# Patient Record
Sex: MALE | Race: WHITE | NOT HISPANIC OR LATINO | Employment: FULL TIME | URBAN - METROPOLITAN AREA
[De-identification: names, ages, dates, MRNs, and addresses within clinical notes are randomized per-mention and may not be internally consistent; named-entity substitution may affect disease eponyms.]

---

## 2023-03-20 DIAGNOSIS — I10 HYPERTENSION, UNSPECIFIED TYPE: Primary | ICD-10-CM

## 2023-03-20 RX ORDER — TELMISARTAN 20 MG/1
20 TABLET ORAL DAILY
Qty: 90 TABLET | Refills: 1 | Status: SHIPPED | OUTPATIENT
Start: 2023-03-20 | End: 2023-08-23 | Stop reason: SDUPTHER

## 2023-03-20 RX ORDER — TELMISARTAN 20 MG/1
20 TABLET ORAL DAILY
COMMUNITY
End: 2023-03-20 | Stop reason: SDUPTHER

## 2023-07-10 ENCOUNTER — APPOINTMENT (OUTPATIENT)
Dept: PRIMARY CARE | Facility: CLINIC | Age: 58
End: 2023-07-10
Payer: COMMERCIAL

## 2023-07-18 ENCOUNTER — APPOINTMENT (OUTPATIENT)
Dept: PRIMARY CARE | Facility: CLINIC | Age: 58
End: 2023-07-18
Payer: COMMERCIAL

## 2023-08-20 DIAGNOSIS — G43.809 OTHER MIGRAINE WITHOUT STATUS MIGRAINOSUS, NOT INTRACTABLE: Primary | ICD-10-CM

## 2023-08-22 PROBLEM — G43.909 MIGRAINE: Status: ACTIVE | Noted: 2023-08-22

## 2023-08-22 PROBLEM — E78.5 HYPERLIPIDEMIA: Status: ACTIVE | Noted: 2023-08-22

## 2023-08-22 PROBLEM — M79.2 NEUROPATHIC PAIN: Status: ACTIVE | Noted: 2021-09-01

## 2023-08-22 PROBLEM — B02.9 SHINGLES: Status: ACTIVE | Noted: 2023-08-22

## 2023-08-22 PROBLEM — D72.829 LEUKOCYTOSIS: Status: ACTIVE | Noted: 2023-08-22

## 2023-08-22 PROBLEM — R11.2 NAUSEA AND VOMITING: Status: ACTIVE | Noted: 2023-08-22

## 2023-08-22 PROBLEM — R73.9 ELEVATED BLOOD SUGAR: Status: ACTIVE | Noted: 2023-08-22

## 2023-08-22 PROBLEM — L42 PITYRIASIS ROSEA: Status: ACTIVE | Noted: 2023-08-22

## 2023-08-22 PROBLEM — N17.9 AKI (ACUTE KIDNEY INJURY) (CMS-HCC): Status: ACTIVE | Noted: 2023-08-22

## 2023-08-22 PROBLEM — Z20.822 EXPOSURE TO COVID-19 VIRUS: Status: ACTIVE | Noted: 2023-08-22

## 2023-08-22 PROBLEM — E78.2 MIXED HYPERLIPIDEMIA: Status: ACTIVE | Noted: 2023-08-22

## 2023-08-22 PROBLEM — K42.9 UMBILICAL HERNIA WITHOUT OBSTRUCTION AND WITHOUT GANGRENE: Status: ACTIVE | Noted: 2023-08-22

## 2023-08-22 PROBLEM — G89.4 CHRONIC PAIN SYNDROME: Status: ACTIVE | Noted: 2021-11-10

## 2023-08-22 PROBLEM — M19.90 DJD (DEGENERATIVE JOINT DISEASE): Status: ACTIVE | Noted: 2018-01-18

## 2023-08-22 PROBLEM — M79.18 MYOFASCIAL PAIN: Status: ACTIVE | Noted: 2021-09-01

## 2023-08-22 PROBLEM — M47.816 SPONDYLOSIS OF LUMBAR REGION WITHOUT MYELOPATHY OR RADICULOPATHY: Status: ACTIVE | Noted: 2021-09-01

## 2023-08-22 PROBLEM — N20.0 NEPHROLITHIASIS: Status: ACTIVE | Noted: 2023-08-22

## 2023-08-22 RX ORDER — CLOBETASOL PROPIONATE 0.5 MG/G
OINTMENT TOPICAL 2 TIMES DAILY
COMMUNITY
Start: 2023-06-01

## 2023-08-22 RX ORDER — SUMATRIPTAN SUCCINATE 25 MG/1
TABLET ORAL
COMMUNITY
Start: 2022-09-19 | End: 2023-08-23 | Stop reason: ALTCHOICE

## 2023-08-22 RX ORDER — DICLOFENAC SODIUM 10 MG/G
GEL TOPICAL
COMMUNITY
Start: 2023-03-22

## 2023-08-22 RX ORDER — DICLOFENAC SODIUM 75 MG/1
75 TABLET, DELAYED RELEASE ORAL 2 TIMES DAILY
COMMUNITY
Start: 2023-05-09 | End: 2023-11-05

## 2023-08-22 RX ORDER — SUMATRIPTAN SUCCINATE 25 MG/1
TABLET ORAL
Qty: 6 TABLET | Refills: 0 | Status: SHIPPED | OUTPATIENT
Start: 2023-08-22

## 2023-08-22 RX ORDER — ADALIMUMAB 40MG/0.4ML
1 KIT SUBCUTANEOUS
COMMUNITY
Start: 2023-05-09

## 2023-08-22 RX ORDER — ATORVASTATIN CALCIUM 40 MG/1
40 TABLET, FILM COATED ORAL NIGHTLY
COMMUNITY
Start: 2023-02-07 | End: 2023-08-23 | Stop reason: SDUPTHER

## 2023-08-22 RX ORDER — SIMVASTATIN 40 MG/1
TABLET, FILM COATED ORAL
COMMUNITY
End: 2024-05-28 | Stop reason: SINTOL

## 2023-08-22 RX ORDER — TRAMADOL HYDROCHLORIDE 50 MG/1
TABLET ORAL
COMMUNITY

## 2023-08-22 RX ORDER — PREDNISONE 5 MG/1
5 TABLET ORAL
COMMUNITY
Start: 2014-08-26 | End: 2023-11-05

## 2023-08-22 RX ORDER — LIDOCAINE 50 MG/G
2 PATCH TOPICAL
COMMUNITY
Start: 2018-05-02

## 2023-08-23 ENCOUNTER — OFFICE VISIT (OUTPATIENT)
Dept: PRIMARY CARE | Facility: CLINIC | Age: 58
End: 2023-08-23
Payer: COMMERCIAL

## 2023-08-23 ENCOUNTER — LAB (OUTPATIENT)
Dept: LAB | Facility: LAB | Age: 58
End: 2023-08-23
Payer: COMMERCIAL

## 2023-08-23 VITALS
DIASTOLIC BLOOD PRESSURE: 66 MMHG | OXYGEN SATURATION: 97 % | HEIGHT: 67 IN | SYSTOLIC BLOOD PRESSURE: 106 MMHG | RESPIRATION RATE: 18 BRPM | BODY MASS INDEX: 33.84 KG/M2 | HEART RATE: 85 BPM | WEIGHT: 215.6 LBS

## 2023-08-23 DIAGNOSIS — E78.2 MIXED HYPERLIPIDEMIA: ICD-10-CM

## 2023-08-23 DIAGNOSIS — Z79.52 CURRENT CHRONIC USE OF SYSTEMIC STEROIDS: ICD-10-CM

## 2023-08-23 DIAGNOSIS — I10 HYPERTENSION, UNSPECIFIED TYPE: ICD-10-CM

## 2023-08-23 DIAGNOSIS — M45.9 ANKYLOSING SPONDYLITIS, UNSPECIFIED SITE OF SPINE (MULTI): ICD-10-CM

## 2023-08-23 DIAGNOSIS — G43.809 OTHER MIGRAINE WITHOUT STATUS MIGRAINOSUS, NOT INTRACTABLE: ICD-10-CM

## 2023-08-23 DIAGNOSIS — I10 ESSENTIAL HYPERTENSION, BENIGN: Primary | ICD-10-CM

## 2023-08-23 DIAGNOSIS — I10 ESSENTIAL HYPERTENSION, BENIGN: ICD-10-CM

## 2023-08-23 PROBLEM — N17.9 AKI (ACUTE KIDNEY INJURY) (CMS-HCC): Status: RESOLVED | Noted: 2023-08-22 | Resolved: 2023-08-23

## 2023-08-23 PROBLEM — B02.9 SHINGLES: Status: RESOLVED | Noted: 2023-08-22 | Resolved: 2023-08-23

## 2023-08-23 PROBLEM — R11.2 NAUSEA AND VOMITING: Status: RESOLVED | Noted: 2023-08-22 | Resolved: 2023-08-23

## 2023-08-23 PROBLEM — Z20.822 EXPOSURE TO COVID-19 VIRUS: Status: RESOLVED | Noted: 2023-08-22 | Resolved: 2023-08-23

## 2023-08-23 PROBLEM — L42 PITYRIASIS ROSEA: Status: RESOLVED | Noted: 2023-08-22 | Resolved: 2023-08-23

## 2023-08-23 PROBLEM — K42.9 UMBILICAL HERNIA WITHOUT OBSTRUCTION AND WITHOUT GANGRENE: Status: RESOLVED | Noted: 2023-08-22 | Resolved: 2023-08-23

## 2023-08-23 LAB
ALANINE AMINOTRANSFERASE (SGPT) (U/L) IN SER/PLAS: 30 U/L (ref 10–52)
ALBUMIN (G/DL) IN SER/PLAS: 4.2 G/DL (ref 3.4–5)
ALKALINE PHOSPHATASE (U/L) IN SER/PLAS: 92 U/L (ref 33–120)
ANION GAP IN SER/PLAS: 9 MMOL/L (ref 10–20)
ASPARTATE AMINOTRANSFERASE (SGOT) (U/L) IN SER/PLAS: 17 U/L (ref 9–39)
BASOPHILS (10*3/UL) IN BLOOD BY AUTOMATED COUNT: 0.07 X10E9/L (ref 0–0.1)
BASOPHILS/100 LEUKOCYTES IN BLOOD BY AUTOMATED COUNT: 0.7 % (ref 0–2)
BILIRUBIN TOTAL (MG/DL) IN SER/PLAS: 0.4 MG/DL (ref 0–1.2)
CALCIUM (MG/DL) IN SER/PLAS: 8.7 MG/DL (ref 8.6–10.3)
CARBON DIOXIDE, TOTAL (MMOL/L) IN SER/PLAS: 26 MMOL/L (ref 21–32)
CHLORIDE (MMOL/L) IN SER/PLAS: 100 MMOL/L (ref 98–107)
CREATININE (MG/DL) IN SER/PLAS: 1.12 MG/DL (ref 0.5–1.3)
EOSINOPHILS (10*3/UL) IN BLOOD BY AUTOMATED COUNT: 0.13 X10E9/L (ref 0–0.7)
EOSINOPHILS/100 LEUKOCYTES IN BLOOD BY AUTOMATED COUNT: 1.4 % (ref 0–6)
ERYTHROCYTE DISTRIBUTION WIDTH (RATIO) BY AUTOMATED COUNT: 12.3 % (ref 11.5–14.5)
ERYTHROCYTE MEAN CORPUSCULAR HEMOGLOBIN CONCENTRATION (G/DL) BY AUTOMATED: 32.4 G/DL (ref 32–36)
ERYTHROCYTE MEAN CORPUSCULAR VOLUME (FL) BY AUTOMATED COUNT: 93 FL (ref 80–100)
ERYTHROCYTES (10*6/UL) IN BLOOD BY AUTOMATED COUNT: 4.72 X10E12/L (ref 4.5–5.9)
ESTIMATED AVERAGE GLUCOSE FOR HBA1C: 128 MG/DL
GFR MALE: 77 ML/MIN/1.73M2
GLUCOSE (MG/DL) IN SER/PLAS: 151 MG/DL (ref 74–99)
HEMATOCRIT (%) IN BLOOD BY AUTOMATED COUNT: 44.1 % (ref 41–52)
HEMOGLOBIN (G/DL) IN BLOOD: 14.3 G/DL (ref 13.5–17.5)
HEMOGLOBIN A1C/HEMOGLOBIN TOTAL IN BLOOD: 6.1 %
IMMATURE GRANULOCYTES/100 LEUKOCYTES IN BLOOD BY AUTOMATED COUNT: 0.5 % (ref 0–0.9)
LEUKOCYTES (10*3/UL) IN BLOOD BY AUTOMATED COUNT: 9.6 X10E9/L (ref 4.4–11.3)
LYMPHOCYTES (10*3/UL) IN BLOOD BY AUTOMATED COUNT: 1.7 X10E9/L (ref 1.2–4.8)
LYMPHOCYTES/100 LEUKOCYTES IN BLOOD BY AUTOMATED COUNT: 17.8 % (ref 13–44)
MONOCYTES (10*3/UL) IN BLOOD BY AUTOMATED COUNT: 0.95 X10E9/L (ref 0.1–1)
MONOCYTES/100 LEUKOCYTES IN BLOOD BY AUTOMATED COUNT: 9.9 % (ref 2–10)
NEUTROPHILS (10*3/UL) IN BLOOD BY AUTOMATED COUNT: 6.67 X10E9/L (ref 1.2–7.7)
NEUTROPHILS/100 LEUKOCYTES IN BLOOD BY AUTOMATED COUNT: 69.7 % (ref 40–80)
PLATELETS (10*3/UL) IN BLOOD AUTOMATED COUNT: 269 X10E9/L (ref 150–450)
POTASSIUM (MMOL/L) IN SER/PLAS: 4.2 MMOL/L (ref 3.5–5.3)
PROTEIN TOTAL: 7 G/DL (ref 6.4–8.2)
SODIUM (MMOL/L) IN SER/PLAS: 131 MMOL/L (ref 136–145)
UREA NITROGEN (MG/DL) IN SER/PLAS: 16 MG/DL (ref 6–23)

## 2023-08-23 PROCEDURE — 3074F SYST BP LT 130 MM HG: CPT | Performed by: STUDENT IN AN ORGANIZED HEALTH CARE EDUCATION/TRAINING PROGRAM

## 2023-08-23 PROCEDURE — 83036 HEMOGLOBIN GLYCOSYLATED A1C: CPT

## 2023-08-23 PROCEDURE — 1036F TOBACCO NON-USER: CPT | Performed by: STUDENT IN AN ORGANIZED HEALTH CARE EDUCATION/TRAINING PROGRAM

## 2023-08-23 PROCEDURE — 80053 COMPREHEN METABOLIC PANEL: CPT

## 2023-08-23 PROCEDURE — 36415 COLL VENOUS BLD VENIPUNCTURE: CPT

## 2023-08-23 PROCEDURE — 99214 OFFICE O/P EST MOD 30 MIN: CPT | Performed by: STUDENT IN AN ORGANIZED HEALTH CARE EDUCATION/TRAINING PROGRAM

## 2023-08-23 PROCEDURE — 3078F DIAST BP <80 MM HG: CPT | Performed by: STUDENT IN AN ORGANIZED HEALTH CARE EDUCATION/TRAINING PROGRAM

## 2023-08-23 PROCEDURE — 85025 COMPLETE CBC W/AUTO DIFF WBC: CPT

## 2023-08-23 RX ORDER — TELMISARTAN 20 MG/1
20 TABLET ORAL DAILY
Qty: 90 TABLET | Refills: 1 | Status: SHIPPED | OUTPATIENT
Start: 2023-08-23 | End: 2024-02-26 | Stop reason: SDUPTHER

## 2023-08-23 RX ORDER — ATORVASTATIN CALCIUM 40 MG/1
40 TABLET, FILM COATED ORAL NIGHTLY
Qty: 90 TABLET | Refills: 1 | Status: SHIPPED | OUTPATIENT
Start: 2023-08-23 | End: 2024-02-26 | Stop reason: SDUPTHER

## 2023-08-23 RX ORDER — SUMATRIPTAN SUCCINATE 25 MG/1
TABLET ORAL
Qty: 9 TABLET | Refills: 3 | Status: SHIPPED | OUTPATIENT
Start: 2023-08-23

## 2023-08-23 ASSESSMENT — PATIENT HEALTH QUESTIONNAIRE - PHQ9
SUM OF ALL RESPONSES TO PHQ9 QUESTIONS 1 AND 2: 0
1. LITTLE INTEREST OR PLEASURE IN DOING THINGS: NOT AT ALL
2. FEELING DOWN, DEPRESSED OR HOPELESS: NOT AT ALL

## 2023-08-23 NOTE — ASSESSMENT & PLAN NOTE
Continue telmisartan 20 mg daily  Physical exam was stable. Discussed maintaining diets such as DASH to help maintain normal Blood pressure. Encouraged regular exercise for a total of 120 min weekly. We will fu labs in 1-3 days. For now there will be no change in medical management. All questions and concerns were addressed. Pt will follow up in 4-6 months or sooner if needed.

## 2023-08-23 NOTE — PROGRESS NOTES
Subjective   Patient ID: Javier Tomlin Sr. is a 58 y.o. male who presents for Follow-up (Pt is here for a follow up and med refills.).  PMH sig for kidney stones, HTN, anemia, left epicondylitis, plantar fasciitis, Achilles tendinitis    Ankylosing spondylitis  Following with rheumatology no changes have been made to his regimen since last time I saw him    Migraines   Doing well with sumatriptan   Using only two monthly currently         Review of Systems    Objective   Physical Exam  Vitals reviewed.   Constitutional:       Appearance: Normal appearance.   Cardiovascular:      Rate and Rhythm: Normal rate and regular rhythm.      Heart sounds: No murmur heard.  Pulmonary:      Effort: Pulmonary effort is normal. No respiratory distress.      Breath sounds: Normal breath sounds. No wheezing.   Musculoskeletal:      Cervical back: Neck supple.      Left lower leg: No edema.   Neurological:      Mental Status: He is alert.         Assessment/Plan   Problem List Items Addressed This Visit       Ankylosing spondylitis (CMS/HCC)     Currently stable   Following with rheumatology   Humira and chronic prednisone 5mg daily   Tramadol for pain management     Dx: staph infection his senior year of high school (1985).  Disease course:   s/p right DUGLAS by age 32  s/p left hip resurfacing 2018  XR lumbar Jan 2017: DEGENERATIVE DISC AND FACET DISEASE  MRI C-spine Nov 2013:   fusion of the C1 lateral masses and occipital condyles.  C1/2 articulations appear to be normally positioned without evidence of subluxation.  Subtle T1 and T2 hypointense etching is noted of all the vertebral bodies and discs in keeping with the patient's diagnosis of ankylosing spondylitis with syndesmophytes formation.   partial fusion of some of the disc spaces most conspicuously at the T2/3 level anteriorly.  suggestion of fusion of facet joints especially the C3/4 facet joints.         Essential hypertension, benign - Primary     Continue telmisartan  20 mg daily  Physical exam was stable. Discussed maintaining diets such as DASH to help maintain normal Blood pressure. Encouraged regular exercise for a total of 120 min weekly. We will fu labs in 1-3 days. For now there will be no change in medical management. All questions and concerns were addressed. Pt will follow up in 4-6 months or sooner if needed.            Relevant Medications    atorvastatin (Lipitor) 40 mg tablet    Other Relevant Orders    CBC and Auto Differential    Comprehensive metabolic panel    Migraine    Relevant Medications    SUMAtriptan (Imitrex) 25 mg tablet    Mixed hyperlipidemia     Continue simvastatin 40 mg daily          Other Visit Diagnoses       Hypertension, unspecified type        Relevant Medications    telmisartan (MIcarDIS) 20 mg tablet    Current chronic use of systemic steroids        Relevant Orders    Hemoglobin A1C

## 2023-08-23 NOTE — ASSESSMENT & PLAN NOTE
Currently stable   Following with rheumatology   Humira and chronic prednisone 5mg daily   Tramadol for pain management     Dx: staph infection his senior year of high school (1985).  Disease course:   s/p right DUGLAS by age 32  s/p left hip resurfacing 2018  XR lumbar Jan 2017: DEGENERATIVE DISC AND FACET DISEASE  MRI C-spine Nov 2013:   fusion of the C1 lateral masses and occipital condyles.  C1/2 articulations appear to be normally positioned without evidence of subluxation.  Subtle T1 and T2 hypointense etching is noted of all the vertebral bodies and discs in keeping with the patient's diagnosis of ankylosing spondylitis with syndesmophytes formation.   partial fusion of some of the disc spaces most conspicuously at the T2/3 level anteriorly.  suggestion of fusion of facet joints especially the C3/4 facet joints.

## 2024-02-26 ENCOUNTER — APPOINTMENT (OUTPATIENT)
Dept: PRIMARY CARE | Facility: CLINIC | Age: 59
End: 2024-02-26
Payer: COMMERCIAL

## 2024-02-26 DIAGNOSIS — I10 HYPERTENSION, UNSPECIFIED TYPE: ICD-10-CM

## 2024-02-26 DIAGNOSIS — I10 ESSENTIAL HYPERTENSION, BENIGN: ICD-10-CM

## 2024-02-26 RX ORDER — TELMISARTAN 20 MG/1
20 TABLET ORAL DAILY
Qty: 90 TABLET | Refills: 1 | Status: SHIPPED | OUTPATIENT
Start: 2024-02-26

## 2024-02-26 RX ORDER — ATORVASTATIN CALCIUM 40 MG/1
40 TABLET, FILM COATED ORAL NIGHTLY
Qty: 90 TABLET | Refills: 1 | Status: SHIPPED | OUTPATIENT
Start: 2024-02-26

## 2024-05-20 ENCOUNTER — OFFICE VISIT (OUTPATIENT)
Dept: PRIMARY CARE | Facility: CLINIC | Age: 59
End: 2024-05-20
Payer: COMMERCIAL

## 2024-05-20 VITALS
RESPIRATION RATE: 16 BRPM | OXYGEN SATURATION: 95 % | BODY MASS INDEX: 31.77 KG/M2 | SYSTOLIC BLOOD PRESSURE: 102 MMHG | WEIGHT: 209.6 LBS | HEART RATE: 70 BPM | DIASTOLIC BLOOD PRESSURE: 56 MMHG | HEIGHT: 68 IN

## 2024-05-20 DIAGNOSIS — R05.2 SUBACUTE COUGH: ICD-10-CM

## 2024-05-20 DIAGNOSIS — E11.9 TYPE 2 DIABETES MELLITUS WITHOUT COMPLICATION, WITHOUT LONG-TERM CURRENT USE OF INSULIN (MULTI): ICD-10-CM

## 2024-05-20 DIAGNOSIS — I10 ESSENTIAL HYPERTENSION, BENIGN: ICD-10-CM

## 2024-05-20 DIAGNOSIS — M45.0 ANKYLOSING SPONDYLITIS OF MULTIPLE SITES IN SPINE (MULTI): ICD-10-CM

## 2024-05-20 DIAGNOSIS — Z00.00 ANNUAL PHYSICAL EXAM: Primary | ICD-10-CM

## 2024-05-20 LAB — POC HEMOGLOBIN A1C: 6.7 % (ref 4.2–6.5)

## 2024-05-20 PROCEDURE — 99214 OFFICE O/P EST MOD 30 MIN: CPT | Performed by: STUDENT IN AN ORGANIZED HEALTH CARE EDUCATION/TRAINING PROGRAM

## 2024-05-20 PROCEDURE — 99396 PREV VISIT EST AGE 40-64: CPT | Performed by: STUDENT IN AN ORGANIZED HEALTH CARE EDUCATION/TRAINING PROGRAM

## 2024-05-20 PROCEDURE — 83036 HEMOGLOBIN GLYCOSYLATED A1C: CPT | Performed by: STUDENT IN AN ORGANIZED HEALTH CARE EDUCATION/TRAINING PROGRAM

## 2024-05-20 PROCEDURE — 4010F ACE/ARB THERAPY RXD/TAKEN: CPT | Performed by: STUDENT IN AN ORGANIZED HEALTH CARE EDUCATION/TRAINING PROGRAM

## 2024-05-20 PROCEDURE — 3074F SYST BP LT 130 MM HG: CPT | Performed by: STUDENT IN AN ORGANIZED HEALTH CARE EDUCATION/TRAINING PROGRAM

## 2024-05-20 PROCEDURE — 3078F DIAST BP <80 MM HG: CPT | Performed by: STUDENT IN AN ORGANIZED HEALTH CARE EDUCATION/TRAINING PROGRAM

## 2024-05-20 RX ORDER — PREDNISONE 5 MG/1
5 TABLET ORAL DAILY
COMMUNITY

## 2024-05-20 RX ORDER — LANCETS
EACH MISCELLANEOUS
Qty: 100 EACH | Refills: 3 | Status: SHIPPED | OUTPATIENT
Start: 2024-05-20

## 2024-05-20 RX ORDER — BENZONATATE 200 MG/1
200 CAPSULE ORAL 3 TIMES DAILY PRN
Qty: 42 CAPSULE | Refills: 0 | Status: SHIPPED | OUTPATIENT
Start: 2024-05-20 | End: 2024-06-19

## 2024-05-20 RX ORDER — IBUPROFEN 200 MG
1 CAPSULE ORAL DAILY
Qty: 100 EACH | Refills: 3 | Status: SHIPPED | OUTPATIENT
Start: 2024-05-20

## 2024-05-20 ASSESSMENT — PATIENT HEALTH QUESTIONNAIRE - PHQ9
SUM OF ALL RESPONSES TO PHQ9 QUESTIONS 1 AND 2: 0
2. FEELING DOWN, DEPRESSED OR HOPELESS: NOT AT ALL
1. LITTLE INTEREST OR PLEASURE IN DOING THINGS: NOT AT ALL

## 2024-05-20 NOTE — PROGRESS NOTES
History Of Present Illness  Javier Tomlin Sr. is a 59 y.o. male presents for cpe.     Getting over sinus infection   Congestion, decreased hearing, dry cough    Has been getting upset stomach  Increased     Ankylosing spondylitis  Had a renal spike   Cut back on diclofenac   Currently on tramadol 50mg 2 tabs q6h and prednisone         Past Medical History  He has a past medical history of Arthritis, Headache, and Hypertension.    Surgical History  He has a past surgical history that includes Hip surgery (08/26/2014); Total hip arthroplasty (10/23/2018); Umbilical hernia repair (10/23/2018); Joint replacement; and MR angio head wo IV contrast (2/11/2016).     Social History  He reports that he has never smoked. He has never used smokeless tobacco. He reports that he does not currently use alcohol. He reports that he does not use drugs.    Family History  No family history on file.     Allergies  Patient has no known allergies.       Physical Exam  Vitals reviewed.   Constitutional:       General: He is not in acute distress.     Appearance: He is not ill-appearing.   HENT:      Right Ear: Tympanic membrane and ear canal normal.      Left Ear: Tympanic membrane and ear canal normal.      Mouth/Throat:      Mouth: Mucous membranes are moist.      Pharynx: Oropharynx is clear. No oropharyngeal exudate or posterior oropharyngeal erythema.   Eyes:      Extraocular Movements: Extraocular movements intact.      Conjunctiva/sclera: Conjunctivae normal.      Pupils: Pupils are equal, round, and reactive to light.   Neck:      Vascular: No carotid bruit.   Cardiovascular:      Rate and Rhythm: Normal rate and regular rhythm.      Heart sounds: No murmur heard.     No gallop.   Pulmonary:      Effort: Pulmonary effort is normal.      Breath sounds: Normal breath sounds. No wheezing, rhonchi or rales.   Abdominal:      General: Abdomen is flat. Bowel sounds are normal.      Palpations: Abdomen is soft.      Tenderness: There  is no abdominal tenderness.   Musculoskeletal:      Cervical back: Neck supple.      Left lower leg: No edema.   Skin:     General: Skin is warm and dry.      Findings: No rash.   Neurological:      General: No focal deficit present.      Mental Status: He is alert and oriented to person, place, and time.      Gait: Gait normal.   Psychiatric:         Mood and Affect: Mood normal.         Behavior: Behavior normal.        Last Recorded Vitals  /56   Pulse 70   Resp 16   Wt 95.1 kg (209 lb 9.6 oz)   SpO2 95%     Relevant Results      Current Outpatient Medications:     atorvastatin (Lipitor) 40 mg tablet, Take 1 tablet (40 mg) by mouth once daily at bedtime., Disp: 90 tablet, Rfl: 1    clobetasol (Temovate) 0.05 % ointment, Apply topically twice a day., Disp: , Rfl:     diclofenac sodium (Voltaren) 1 % gel gel, apply two inches to both heels twice a day., Disp: , Rfl:     lidocaine (Lidoderm) 5 % patch, Place 2 patches on the skin once daily., Disp: , Rfl:     predniSONE (Deltasone) 5 mg tablet, Take 1 tablet (5 mg) by mouth once daily., Disp: , Rfl:     SUMAtriptan (Imitrex) 25 mg tablet, TAKE 1 TABLET BY MOUTH AT THE ONSET OF HEADACHE. MAY TAKE 2ND DOSE IN 2 HOURS IF NEEDED. DO NOT EXCEED 200 MG (8 TABLETS) IN 24 HOURS, Disp: 9 tablet, Rfl: 3    telmisartan (MIcarDIS) 20 mg tablet, Take 1 tablet (20 mg) by mouth once daily., Disp: 90 tablet, Rfl: 1    traMADol (Ultram) 50 mg tablet, TAKE TWO TABLETS BY MOUTH EVERY SIX HOURS AS NEEDED FOR PAIN, Disp: , Rfl:     Humira,CF, Pen 40 mg/0.4 mL pen injector kit pen-injector, Inject 1 Pen (40 mg) under the skin every 14 (fourteen) days., Disp: , Rfl:     simvastatin (Zocor) 40 mg tablet, TAKE 1 AND 1/2 TABLETS BY MOUTH DAILY EVERY EVENING WITH A MEAL., Disp: , Rfl:     SUMAtriptan (Imitrex) 25 mg tablet, TAKE 1 TABLET BY MOUTH AT THE ONSET OF HEADACHE. MAY TAKE 2ND DOSE IN 2 HOURS IF NEEDED. DO NOT EXCEED 200 MG (8 TABLETS) IN 24 HOURS (Patient not taking:  Reported on 5/20/2024), Disp: 6 tablet, Rfl: 0        Assessment/Plan   Problem List Items Addressed This Visit             ICD-10-CM    Ankylosing spondylitis (Multi) M45.9    Relevant Medications    predniSONE (Deltasone) 5 mg tablet    Essential hypertension, benign - Primary I10     Other Visit Diagnoses         Codes    Subacute cough     R05.2    Relevant Medications    benzonatate (Tessalon) 200 mg capsule    Type 2 diabetes mellitus without complication, without long-term current use of insulin (Multi)     E11.9    Relevant Medications    lancets misc    blood sugar diagnostic (Blood Glucose Test) strip    Other Relevant Orders    POCT glycosylated hemoglobin (Hb A1C) manually resulted (Completed)    Home blood glucose meter            Ankylosing Spondylosis     Currently stable   Following with rheumatology   Humira and chronic prednisone 5mg daily   Tramadol for pain management      Dx: staph infection his senior year of high school (1985).  Disease course:   s/p right DUGLAS by age 32  s/p left hip resurfacing 2018  XR lumbar Jan 2017: DEGENERATIVE DISC AND FACET DISEASE  MRI C-spine Nov 2013:   fusion of the C1 lateral masses and occipital condyles.  C1/2 articulations appear to be normally positioned without evidence of subluxation.  Subtle T1 and T2 hypointense etching is noted of all the vertebral bodies and discs in keeping with the patient's diagnosis of ankylosing spondylitis with syndesmophytes formation.   partial fusion of some of the disc spaces most conspicuously at the T2/3 level anteriorly.  suggestion of fusion of facet joints especially the C3/4 facet joints.    HTN  Continue telmisartan 20 mg daily  Physical exam was stable. Discussed maintaining diets such as DASH to help maintain normal Blood pressure. Encouraged regular exercise for a total of 120 min weekly. We will fu labs in 1-3 days. For now there will be no change in medical management. All questions and concerns were addressed. Pt  will follow up in 4-6 months or sooner if needed.     Migraines   Intermittent   Stable with abortive therapy   Sumatriptan 25mg daily     HLD   Mixed   Pt stopped simvastatin due to muscle aches      Health Maintenance   Topic Date Due    Yearly Adult Physical  Never done    HIV Screening  Never done    Diabetes: Foot Exam  Never done    Diabetes: Retinopathy Screening  Never done    Hepatitis C Screening  Never done    Hepatitis B Vaccines (1 of 3 - 19+ 3-dose series) Never done    Diabetes: Urine Protein Screening  Never done    Zoster Vaccines (1 of 2) Never done    COVID-19 Vaccine (2 - Pfizer risk series) 03/18/2021    Lipid Panel  01/09/2024    Diabetes: Hemoglobin A1C  08/20/2024    Influenza Vaccine (Season Ended) 09/01/2024    Colorectal Cancer Screening  12/14/2030    Pneumococcal Vaccine: Pediatrics (0 to 5 Years) and At-Risk Patients (6 to 64 Years) (3 of 3 - PPSV23 or PCV20) 02/10/2032    DTaP/Tdap/Td Vaccines (2 - Td or Tdap) 09/12/2032    MMR Vaccines  Completed    HIB Vaccines  Aged Out    IPV Vaccines  Aged Out    Hepatitis A Vaccines  Aged Out    Meningococcal Vaccine  Aged Out    Rotavirus Vaccines  Aged Out    HPV Vaccines  Aged Out    Irritable Bowel Syndrome  Discontinued            Katlyn Mckenzie,

## 2024-05-21 ENCOUNTER — APPOINTMENT (OUTPATIENT)
Dept: PRIMARY CARE | Facility: CLINIC | Age: 59
End: 2024-05-21
Payer: COMMERCIAL

## 2024-08-26 ENCOUNTER — APPOINTMENT (OUTPATIENT)
Dept: PRIMARY CARE | Facility: CLINIC | Age: 59
End: 2024-08-26
Payer: COMMERCIAL

## 2024-08-26 VITALS
DIASTOLIC BLOOD PRESSURE: 64 MMHG | BODY MASS INDEX: 33.9 KG/M2 | WEIGHT: 216 LBS | HEIGHT: 67 IN | OXYGEN SATURATION: 96 % | SYSTOLIC BLOOD PRESSURE: 100 MMHG | HEART RATE: 70 BPM

## 2024-08-26 DIAGNOSIS — I10 ESSENTIAL HYPERTENSION, BENIGN: ICD-10-CM

## 2024-08-26 DIAGNOSIS — E11.9 TYPE 2 DIABETES MELLITUS WITHOUT COMPLICATION, WITHOUT LONG-TERM CURRENT USE OF INSULIN (MULTI): ICD-10-CM

## 2024-08-26 DIAGNOSIS — G43.809 OTHER MIGRAINE WITHOUT STATUS MIGRAINOSUS, NOT INTRACTABLE: ICD-10-CM

## 2024-08-26 DIAGNOSIS — I10 HYPERTENSION, UNSPECIFIED TYPE: ICD-10-CM

## 2024-08-26 PROCEDURE — 99214 OFFICE O/P EST MOD 30 MIN: CPT | Performed by: STUDENT IN AN ORGANIZED HEALTH CARE EDUCATION/TRAINING PROGRAM

## 2024-08-26 PROCEDURE — 3008F BODY MASS INDEX DOCD: CPT | Performed by: STUDENT IN AN ORGANIZED HEALTH CARE EDUCATION/TRAINING PROGRAM

## 2024-08-26 PROCEDURE — 1036F TOBACCO NON-USER: CPT | Performed by: STUDENT IN AN ORGANIZED HEALTH CARE EDUCATION/TRAINING PROGRAM

## 2024-08-26 PROCEDURE — 3078F DIAST BP <80 MM HG: CPT | Performed by: STUDENT IN AN ORGANIZED HEALTH CARE EDUCATION/TRAINING PROGRAM

## 2024-08-26 PROCEDURE — 3074F SYST BP LT 130 MM HG: CPT | Performed by: STUDENT IN AN ORGANIZED HEALTH CARE EDUCATION/TRAINING PROGRAM

## 2024-08-26 PROCEDURE — 4010F ACE/ARB THERAPY RXD/TAKEN: CPT | Performed by: STUDENT IN AN ORGANIZED HEALTH CARE EDUCATION/TRAINING PROGRAM

## 2024-08-26 RX ORDER — TELMISARTAN 20 MG/1
20 TABLET ORAL DAILY
Qty: 90 TABLET | Refills: 1 | Status: SHIPPED | OUTPATIENT
Start: 2024-08-26

## 2024-08-26 RX ORDER — ATORVASTATIN CALCIUM 40 MG/1
40 TABLET, FILM COATED ORAL NIGHTLY
Qty: 90 TABLET | Refills: 1 | Status: SHIPPED | OUTPATIENT
Start: 2024-08-26

## 2024-08-26 RX ORDER — SUMATRIPTAN SUCCINATE 25 MG/1
TABLET ORAL
Qty: 9 TABLET | Refills: 3 | Status: SHIPPED | OUTPATIENT
Start: 2024-08-26

## 2024-08-26 RX ORDER — INSULIN PUMP SYRINGE, 3 ML
1 EACH MISCELLANEOUS AS NEEDED
Qty: 1 EACH | Refills: 0 | Status: SHIPPED | OUTPATIENT
Start: 2024-08-26 | End: 2025-08-26

## 2024-08-26 ASSESSMENT — PATIENT HEALTH QUESTIONNAIRE - PHQ9
2. FEELING DOWN, DEPRESSED OR HOPELESS: NOT AT ALL
1. LITTLE INTEREST OR PLEASURE IN DOING THINGS: NOT AT ALL
SUM OF ALL RESPONSES TO PHQ9 QUESTIONS 1 AND 2: 0

## 2024-08-26 NOTE — PROGRESS NOTES
Subjective   Patient ID: Javier Tomlin Sr. is a 59 y.o. male who presents for Follow-up (Pt is here for a 3 month dm follow up.).    HPI   Pt presents to the office for evaluation of sugars. Last visit he was diagnosed with DM with an A1C of 6.7%.   Prednisone of 5mg daily due to Ankylosing Spondylitis   Pt recently switched to     Pt admits to night sweats recently around 2am in the morning.       Objective   Physical Exam  Vitals reviewed.   Constitutional:       Appearance: Normal appearance.   Cardiovascular:      Rate and Rhythm: Normal rate and regular rhythm.      Heart sounds: No murmur heard.  Pulmonary:      Effort: Pulmonary effort is normal. No respiratory distress.      Breath sounds: Normal breath sounds. No wheezing.   Musculoskeletal:      Cervical back: Neck supple.      Left lower leg: No edema.   Neurological:      Mental Status: He is alert.         Assessment/Plan   Diagnoses and all orders for this visit:  Type 2 diabetes mellitus without complication, without long-term current use of insulin (Multi)  -     Diabetic Supplies Glucometer  -     Blood glucose monitoring meter kit kit; 1 each if needed (use once daily to check blood sugar).  Hypertension, unspecified type  -     telmisartan (MIcarDIS) 20 mg tablet; Take 1 tablet (20 mg) by mouth once daily.  Essential hypertension, benign  -     atorvastatin (Lipitor) 40 mg tablet; Take 1 tablet (40 mg) by mouth once daily at bedtime.  Other migraine without status migrainosus, not intractable  -     SUMAtriptan (Imitrex) 25 mg tablet; TAKE 1 TABLET BY MOUTH AT THE ONSET OF HEADACHE. MAY TAKE 2ND DOSE IN 2 HOURS IF NEEDED. DO NOT EXCEED 200 MG (8 TABLETS) IN 24 HOURS            DM  A1C 6.1% 8/21/24  Advised pt to start checking his blood sugar when he is waking up in the middle of the night     Dx: staph infection his senior year of high school (1985).  Disease course:   s/p right DUGLAS by age 32  s/p left hip resurfacing 2018  XR lumbar Jan 2017:  DEGENERATIVE DISC AND FACET DISEASE  MRI C-spine Nov 2013:   fusion of the C1 lateral masses and occipital condyles.  C1/2 articulations appear to be normally positioned without evidence of subluxation.  Subtle T1 and T2 hypointense etching is noted of all the vertebral bodies and discs in keeping with the patient's diagnosis of ankylosing spondylitis with syndesmophytes formation.   partial fusion of some of the disc spaces most conspicuously at the T2/3 level anteriorly.  suggestion of fusion of facet joints especially the C3/4 facet joints.     HTN  Continue telmisartan 20 mg daily  Physical exam was stable. Discussed maintaining diets such as DASH to help maintain normal Blood pressure. Encouraged regular exercise for a total of 120 min weekly. We will fu labs in 1-3 days. For now there will be no change in medical management. All questions and concerns were addressed. Pt will follow up in 4-6 months or sooner if needed.     Currently stable   Following with rheumatology   Previously stable on Humira and chronic prednisone 5mg daily   Tramadol for pain management         Migraines   Intermittent stable   Stable with abortive therapy   Sumatriptan 25mg daily PRN     HLD   Mixed   Pt stopped simvastatin due to muscle aches          Health Maintenance  Topic Date Due   Yearly Adult Physical  Never done   HIV Screening  Never done   Diabetes: Foot Exam  Never done   Diabetes: Retinopathy Screening  Never done   Hepatitis C Screening  Never done   Hepatitis B Vaccines (1 of 3 - 19+ 3-dose series) Never done   Diabetes: Urine Protein Screening  Never done   Zoster Vaccines (1 of 2) Never done   COVID-19 Vaccine (2 - Pfizer risk series) 03/18/2021   Lipid Panel  01/09/2024   Diabetes: Hemoglobin A1C  08/20/2024   Influenza Vaccine (Season Ended) 09/01/2024   Colorectal Cancer Screening  12/14/2030   Pneumococcal Vaccine: Pediatrics (0 to 5 Years) and At-Risk Patients (6 to 64 Years) (3 of 3 - PPSV23 or  PCV20) 02/10/2032   DTaP/Tdap/Td Vaccines (2 - Td or Tdap) 09/12/2032   MMR Vaccines  Completed   HIB Vaccines  Aged Out   IPV Vaccines  Aged Out   Hepatitis A Vaccines  Aged Out   Meningococcal Vaccine  Aged Out   Rotavirus Vaccines  Aged Out   HPV Vaccines  Aged Out   Irritable Bowel Syndrome  Discontinued                 Katlyn Mckenzie DO       Electronically signed by Katlyn Mckenzie DO at 5/28/2024  5:59 PM         Katlyn Mckenzie DO 08/26/24 5:01 PM

## 2025-02-23 DIAGNOSIS — I10 HYPERTENSION, UNSPECIFIED TYPE: ICD-10-CM

## 2025-02-23 DIAGNOSIS — I10 ESSENTIAL HYPERTENSION, BENIGN: ICD-10-CM

## 2025-02-24 RX ORDER — ATORVASTATIN CALCIUM 40 MG/1
40 TABLET, FILM COATED ORAL NIGHTLY
Qty: 90 TABLET | Refills: 1 | Status: SHIPPED | OUTPATIENT
Start: 2025-02-24

## 2025-02-24 RX ORDER — TELMISARTAN 20 MG/1
20 TABLET ORAL DAILY
Qty: 90 TABLET | Refills: 1 | Status: SHIPPED | OUTPATIENT
Start: 2025-02-24

## 2025-02-27 ENCOUNTER — OFFICE VISIT (OUTPATIENT)
Dept: PRIMARY CARE | Facility: CLINIC | Age: 60
End: 2025-02-27
Payer: COMMERCIAL

## 2025-02-27 ENCOUNTER — APPOINTMENT (OUTPATIENT)
Dept: PRIMARY CARE | Facility: CLINIC | Age: 60
End: 2025-02-27
Payer: COMMERCIAL

## 2025-02-27 VITALS
OXYGEN SATURATION: 90 % | HEIGHT: 67 IN | HEART RATE: 100 BPM | DIASTOLIC BLOOD PRESSURE: 75 MMHG | BODY MASS INDEX: 33.83 KG/M2 | SYSTOLIC BLOOD PRESSURE: 121 MMHG

## 2025-02-27 DIAGNOSIS — B34.9 VIRAL SYNDROME: ICD-10-CM

## 2025-02-27 DIAGNOSIS — J10.1 INFLUENZA A: Primary | ICD-10-CM

## 2025-02-27 LAB
POC RAPID INFLUENZA A: POSITIVE
POC RAPID INFLUENZA B: NEGATIVE

## 2025-02-27 PROCEDURE — 99213 OFFICE O/P EST LOW 20 MIN: CPT

## 2025-02-27 PROCEDURE — 3074F SYST BP LT 130 MM HG: CPT

## 2025-02-27 PROCEDURE — 1036F TOBACCO NON-USER: CPT

## 2025-02-27 PROCEDURE — 3078F DIAST BP <80 MM HG: CPT

## 2025-02-27 PROCEDURE — 87804 INFLUENZA ASSAY W/OPTIC: CPT

## 2025-02-27 RX ORDER — OSELTAMIVIR PHOSPHATE 75 MG/1
75 CAPSULE ORAL 2 TIMES DAILY
Qty: 10 CAPSULE | Refills: 0 | Status: SHIPPED | OUTPATIENT
Start: 2025-02-27 | End: 2025-03-04

## 2025-02-27 ASSESSMENT — ENCOUNTER SYMPTOMS
HEADACHES: 1
VOMITING: 0
RHINORRHEA: 1
ABDOMINAL PAIN: 0
DIARRHEA: 1
NAUSEA: 1
COUGH: 1

## 2025-02-27 NOTE — PATIENT INSTRUCTIONS
follow-up with your PCP in 2-3 days, sooner if not  improving.    Follow-up immediately if symptoms worsen. If experiencing symptoms including but not limited to lethargy / chest pain / weakness / dizziness / difficulty breathing please call 911 or go to the emergency department for immediate care.     Drinking plenty of fluids and getting lots of rest. Chicken soup and hot beverages may help.    Trial of nasal irrigation with a Nettipot or squeeze bottle with sterile salt water.    Nasal spray corticosteroids (Flonase) may help in reducing the inflammatory response in the nasal passages and airways. Please try 2 sprays each nostril daily for 2 weeks.  If you have season allergies, please take a daily antihistamine of your choice, such as Zyrtec/Claritin/Allegra at bedtime.    Take Tylenol or Motrin/Aleve for sinus or ear pain.    If you have good blood pressure you can try pseudofed (you must ask the pharmacist for it and show ID).    Please call or return to the office if you are not feeling better in the next 3-4 days after starting treatment.    Over-the-counter cold and cough medications     Take Mucinex for cough, drink plenty of fluids with this medication and will help break up congestion making it easier to cough up     For cough can use honey (children ages 1 and up) in hot tea or hot water. I recommend putting this in an insulated cup and carrying it around throughout the day to sip on.  Have it at your bedside at night in case you wake up coughing.  You can also use honey cough drops (adults and older children).     Recommend nasal saline for use in children and adults.  Neti Rivera can also be helpful.  (Never used tap water and a Neti pot.  Use distilled water.)     If you have plugged up congested ears or the feeling of fluid in your ears, you can use an over-the-counter nasal steroid spray like fluticasone (brand name Flonase) use 2 sprays into each nostril once or twice a day for 7 days.  This will  help open up the eustachian tubes and allow the fluid to drain out of your ears.     Sleeping with your head/chest elevated can help with sinus drainage.     Adults only-can use nasal decongestant (like Afrin) at bedtime to open nasal passages so you can breathe through your nose while you sleep; avoid using for longer than 3 days as this medication can become addicting.  Do not use if you have high blood pressure or high heart rate.     For severe pain or fever in adult-Tylenol (2 extra strength) or ibuprofen (3-4 tabs equals 600 to 800 mg) alternating as needed for pain.  Tylenol doses should be 6 to 8 hours apart and ibuprofen doses should be 6 to 8 hours apart.        Common cold medications for adults explained:     Mucinex-(generic name guaifenesin)-is an expectorant.  This will thin out all the thick mucus.  Must drink plenty of liquids for this medicine to work.     Dextromethorphan (brand name Delsym or DM)-this medicine is a cough suppressant     Honey in hot water or tea-this is a natural cough suppressant     Decongestant nasal spray- (eg: Afrin) use for temporary relief of nasal congestion-best when used at bedtime to open up nasal passages so that you are not forced to mouth breathe overnight.     Sudafed (generic name pseudoephedrine-this must be bought from the pharmacist) DO NOT use this medicine if you have high blood pressure as it can raise your blood pressure higher.  Do not use if you have any irregular heart rate.  This medicine can help clear congestion in your sinuses.

## 2025-02-27 NOTE — PROGRESS NOTES
"Subjective   Patient ID: Javier Tomlin Zach is a 60 y.o. male who presents for URI (Cough, headache, nobody aches, light headed, vomiting, blood this morning unsure from nose or coughed up in the middle of the night. X1 day/Wife tested positive for FLU A on Tuesday.).    URI   This is a new problem. The current episode started yesterday. The problem has been gradually worsening. Maximum temperature: not measured. Associated symptoms include congestion, coughing, diarrhea, headaches, joint pain, nausea and rhinorrhea. Pertinent negatives include no abdominal pain, ear pain, plugged ear sensation or vomiting. Associated symptoms comments: Nose bleed  . He has tried nothing for the symptoms. The treatment provided no relief.        Review of Systems   HENT:  Positive for congestion and rhinorrhea. Negative for ear pain.    Respiratory:  Positive for cough.    Gastrointestinal:  Positive for diarrhea and nausea. Negative for abdominal pain and vomiting.   Musculoskeletal:  Positive for joint pain.   Neurological:  Positive for headaches.       Objective   /75   Pulse 100   Ht 1.702 m (5' 7\")   SpO2 90%   BMI 33.83 kg/m²     Physical Exam  Constitutional:       General: He is not in acute distress.     Appearance: Normal appearance. He is ill-appearing and diaphoretic.   HENT:      Nose: Nasal tenderness, mucosal edema, congestion and rhinorrhea present. Rhinorrhea is bloody.   Pulmonary:      Breath sounds: Normal breath sounds. No stridor. No wheezing or rhonchi.   Chest:      Chest wall: Tenderness present.   Neurological:      Mental Status: He is alert.         Assessment/Plan   Javier was seen today for uri.  Diagnoses and all orders for this visit:  Influenza A  -     oseltamivir (Tamiflu) 75 mg capsule; Take 1 capsule (75 mg) by mouth 2 times a day for 5 days.  Viral syndrome  -     POCT Influenza A/B manually resulted           "

## 2025-03-03 ENCOUNTER — OFFICE VISIT (OUTPATIENT)
Dept: PRIMARY CARE | Facility: CLINIC | Age: 60
End: 2025-03-03
Payer: COMMERCIAL

## 2025-03-03 ENCOUNTER — APPOINTMENT (OUTPATIENT)
Dept: PRIMARY CARE | Facility: CLINIC | Age: 60
End: 2025-03-03
Payer: COMMERCIAL

## 2025-03-03 VITALS
WEIGHT: 205.2 LBS | OXYGEN SATURATION: 95 % | HEIGHT: 68 IN | DIASTOLIC BLOOD PRESSURE: 58 MMHG | SYSTOLIC BLOOD PRESSURE: 94 MMHG | BODY MASS INDEX: 31.1 KG/M2 | HEART RATE: 87 BPM

## 2025-03-03 DIAGNOSIS — M45.9 ANKYLOSING SPONDYLITIS, UNSPECIFIED SITE OF SPINE (MULTI): ICD-10-CM

## 2025-03-03 DIAGNOSIS — J10.1 INFLUENZA A: ICD-10-CM

## 2025-03-03 DIAGNOSIS — E78.2 MIXED HYPERLIPIDEMIA: ICD-10-CM

## 2025-03-03 DIAGNOSIS — E11.9 TYPE 2 DIABETES MELLITUS WITHOUT COMPLICATION, WITHOUT LONG-TERM CURRENT USE OF INSULIN (MULTI): ICD-10-CM

## 2025-03-03 DIAGNOSIS — I10 HYPERTENSION, UNSPECIFIED TYPE: Primary | ICD-10-CM

## 2025-03-03 LAB — POC HEMOGLOBIN A1C: 6.6 % (ref 4.2–6.5)

## 2025-03-03 PROCEDURE — 3074F SYST BP LT 130 MM HG: CPT | Performed by: STUDENT IN AN ORGANIZED HEALTH CARE EDUCATION/TRAINING PROGRAM

## 2025-03-03 PROCEDURE — 83036 HEMOGLOBIN GLYCOSYLATED A1C: CPT | Performed by: STUDENT IN AN ORGANIZED HEALTH CARE EDUCATION/TRAINING PROGRAM

## 2025-03-03 PROCEDURE — 1036F TOBACCO NON-USER: CPT | Performed by: STUDENT IN AN ORGANIZED HEALTH CARE EDUCATION/TRAINING PROGRAM

## 2025-03-03 PROCEDURE — 4010F ACE/ARB THERAPY RXD/TAKEN: CPT | Performed by: STUDENT IN AN ORGANIZED HEALTH CARE EDUCATION/TRAINING PROGRAM

## 2025-03-03 PROCEDURE — 3008F BODY MASS INDEX DOCD: CPT | Performed by: STUDENT IN AN ORGANIZED HEALTH CARE EDUCATION/TRAINING PROGRAM

## 2025-03-03 PROCEDURE — 3078F DIAST BP <80 MM HG: CPT | Performed by: STUDENT IN AN ORGANIZED HEALTH CARE EDUCATION/TRAINING PROGRAM

## 2025-03-03 PROCEDURE — 99214 OFFICE O/P EST MOD 30 MIN: CPT | Performed by: STUDENT IN AN ORGANIZED HEALTH CARE EDUCATION/TRAINING PROGRAM

## 2025-03-03 RX ORDER — DICLOFENAC SODIUM 75 MG/1
1 TABLET, DELAYED RELEASE ORAL
COMMUNITY
Start: 2025-02-25

## 2025-03-03 RX ORDER — ALBUTEROL SULFATE 90 UG/1
2 INHALANT RESPIRATORY (INHALATION) EVERY 4 HOURS PRN
Qty: 8.5 G | Refills: 5 | Status: SHIPPED | OUTPATIENT
Start: 2025-03-03 | End: 2026-03-03

## 2025-03-03 RX ORDER — LANCETS 33 GAUGE
EACH MISCELLANEOUS
COMMUNITY
Start: 2024-12-25

## 2025-03-03 RX ORDER — ADALIMUMAB-ADAZ 40 MG/.4ML
1 INJECTION, SOLUTION SUBCUTANEOUS
COMMUNITY
Start: 2024-11-26

## 2025-03-03 NOTE — LETTER
Javier Tomlin Sr.  1965    3/3/2025    To Whom This May Concern:    My patient, Javier Tomlin Sr., is unable to perform Jury Duty due to a mental or physical condition that renders the prospective juror unfit for jury service for the remainder of the jury year.    Should you have any questions please do not hesitate to call.    Thank you for your cooperation.    Sincerely,    Katlyn Mckenzie, DO

## 2025-03-03 NOTE — PROGRESS NOTES
Subjective   Patient ID: Javier Tomlin Sr. is a 60 y.o. male who presents for Follow-up (PT IS HERE FOR A 6 MO DM FOLLOW UP.).  HPI   Interval Hx:   Diagnosed with influenza A last week but feeling much better.         Current Outpatient Medications:     adalimumab-adaz 40 mg/0.4 mL pen injector, Inject 1 Pen under the skin every 14 (fourteen) days., Disp: , Rfl:     atorvastatin (Lipitor) 40 mg tablet, TAKE ONE TABLET BY MOUTH AT BEDTIME, Disp: 90 tablet, Rfl: 1    Blood glucose monitoring meter kit kit, 1 each if needed (use once daily to check blood sugar)., Disp: 1 each, Rfl: 0    blood sugar diagnostic (Blood Glucose Test) strip, 1 strip once daily., Disp: 100 each, Rfl: 3    clobetasol (Temovate) 0.05 % ointment, Apply topically twice a day., Disp: , Rfl:     diclofenac (Voltaren) 75 mg EC tablet, Take 1 tablet (75 mg) by mouth early in the morning.., Disp: , Rfl:     diclofenac sodium (Voltaren) 1 % gel gel, apply two inches to both heels twice a day., Disp: , Rfl:     lancets misc, Test blood sugar once daily, Disp: 100 each, Rfl: 3    lidocaine (Lidoderm) 5 % patch, Place 2 patches on the skin once daily., Disp: , Rfl:     OneTouch Delica Plus Lancet 30 gauge misc, TEST BLOOD SUGAR ONCE DAILY, Disp: , Rfl:     predniSONE (Deltasone) 5 mg tablet, Take 1 tablet (5 mg) by mouth once daily., Disp: , Rfl:     SUMAtriptan (Imitrex) 25 mg tablet, TAKE 1 TABLET BY MOUTH AT THE ONSET OF HEADACHE. MAY TAKE 2ND DOSE IN 2 HOURS IF NEEDED. DO NOT EXCEED 200 MG (8 TABLETS) IN 24 HOURS, Disp: 9 tablet, Rfl: 3    telmisartan (MIcarDIS) 20 mg tablet, Take 1 tablet (20 mg) by mouth once daily., Disp: 90 tablet, Rfl: 1    traMADol (Ultram) 50 mg tablet, TAKE TWO TABLETS BY MOUTH EVERY SIX HOURS AS NEEDED FOR PAIN, Disp: , Rfl:     albuterol (ProAir HFA) 90 mcg/actuation inhaler, Inhale 2 puffs every 4 hours if needed for wheezing or shortness of breath., Disp: 8.5 g, Rfl: 5    oseltamivir (Tamiflu) 75 mg capsule, Take 1  "capsule (75 mg) by mouth 2 times a day for 5 days. (Patient not taking: Reported on 3/3/2025), Disp: 10 capsule, Rfl: 0    Visit Vitals  BP 94/58   Pulse 87   Ht 1.727 m (5' 8\")   Wt 93.1 kg (205 lb 3.2 oz)   SpO2 95%   BMI 31.20 kg/m²   Smoking Status Never   BSA 2.11 m²       Objective   Physical Exam  Vitals reviewed.   Constitutional:       Appearance: Normal appearance.   Cardiovascular:      Rate and Rhythm: Normal rate and regular rhythm.      Heart sounds: No murmur heard.  Pulmonary:      Effort: Pulmonary effort is normal. No respiratory distress.      Breath sounds: Wheezing present.   Musculoskeletal:      Cervical back: Neck supple.      Left lower leg: No edema.   Neurological:      Mental Status: He is alert.       Assessment/Plan   Diagnoses and all orders for this visit:  Influenza A  -     albuterol (ProAir HFA) 90 mcg/actuation inhaler; Inhale 2 puffs every 4 hours if needed for wheezing or shortness of breath.  Type 2 diabetes mellitus without complication, without long-term current use of insulin (Multi)  -     POCT glycosylated hemoglobin (Hb A1C) manually resulted    DM  A1C 6.6% today   ACE/ARB: yes  Statin: intolerant mulsce aches     Influenza A   Continue tamiflu   Add in albuterol for sob and wheezing      HTN  94/58 mmHg  Continue telmisartan 20 mg daily  Physical exam was stable. Discussed maintaining diets such as DASH to help maintain normal Blood pressure. Encouraged regular exercise for a total of 120 min weekly. We will fu labs in 1-3 days. For now there will be no change in medical management. All questions and concerns were addressed. Pt will follow up in 4-6 months or sooner if needed.       Ankylosing Spondylitis    Currently stable   Following with rheumatology   Previously stable on Humira and chronic prednisone 5mg daily   Tramadol for pain management      Migraines   Intermittent stable   Stable with abortive therapy   Sumatriptan 25mg daily PRN     HLD   Mixed   Pt stopped " simvastatin due to muscle aches    Dx: staph infection his senior year of high school (1985).  Disease course:   s/p right DUGLAS by age 32  s/p left hip resurfacing 2018  XR lumbar Jan 2017: DEGENERATIVE DISC AND FACET DISEASE  MRI C-spine Nov 2013:   fusion of the C1 lateral masses and occipital condyles.  C1/2 articulations appear to be normally positioned without evidence of subluxation.  Subtle T1 and T2 hypointense etching is noted of all the vertebral bodies and discs in keeping with the patient's diagnosis of ankylosing spondylitis with syndesmophytes formation.   partial fusion of some of the disc spaces most conspicuously at the T2/3 level anteriorly.  suggestion of fusion of facet joints especially the C3/4 facet joints.    Jury Duty noted given today    Fu in 6 months        Katlyn Mckenzie DO 03/03/25 10:00 AM

## 2025-03-17 ENCOUNTER — APPOINTMENT (OUTPATIENT)
Dept: PRIMARY CARE | Facility: CLINIC | Age: 60
End: 2025-03-17
Payer: COMMERCIAL

## 2025-08-24 DIAGNOSIS — I10 HYPERTENSION, UNSPECIFIED TYPE: ICD-10-CM

## 2025-08-24 DIAGNOSIS — I10 ESSENTIAL HYPERTENSION, BENIGN: ICD-10-CM

## 2025-08-25 RX ORDER — ATORVASTATIN CALCIUM 40 MG/1
40 TABLET, FILM COATED ORAL NIGHTLY
Qty: 90 TABLET | Refills: 0 | Status: SHIPPED | OUTPATIENT
Start: 2025-08-25

## 2025-08-25 RX ORDER — TELMISARTAN 20 MG/1
20 TABLET ORAL DAILY
Qty: 90 TABLET | Refills: 0 | Status: SHIPPED | OUTPATIENT
Start: 2025-08-25

## 2025-08-27 ASSESSMENT — PROMIS GLOBAL HEALTH SCALE
EMOTIONAL_PROBLEMS: NEVER
RATE_MENTAL_HEALTH: GOOD
RATE_AVERAGE_PAIN: 6
CARRYOUT_SOCIAL_ACTIVITIES: GOOD
RATE_GENERAL_HEALTH: GOOD
RATE_SOCIAL_SATISFACTION: GOOD
RATE_QUALITY_OF_LIFE: GOOD
RATE_PHYSICAL_HEALTH: GOOD
RATE_AVERAGE_FATIGUE: MODERATE
CARRYOUT_PHYSICAL_ACTIVITIES: MOSTLY

## 2025-09-03 ENCOUNTER — APPOINTMENT (OUTPATIENT)
Dept: PRIMARY CARE | Facility: CLINIC | Age: 60
End: 2025-09-03
Payer: COMMERCIAL

## 2025-09-03 ASSESSMENT — ENCOUNTER SYMPTOMS
FATIGUE: 0
SORE THROAT: 0
FEVER: 0
NAUSEA: 0
DIZZINESS: 0
SLEEP DISTURBANCE: 0
NERVOUS/ANXIOUS: 0
BACK PAIN: 0
WEAKNESS: 0
CONSTIPATION: 0
COUGH: 0
FREQUENCY: 0
ROS SKIN COMMENTS: NO SKIN LESIONS CHANGING COLOR, SHAPE OR SIZE
DEPRESSION: 0
NUMBNESS: 0
DIFFICULTY URINATING: 0
HEADACHES: 0
ABDOMINAL PAIN: 0
OCCASIONAL FEELINGS OF UNSTEADINESS: 0
DIARRHEA: 0
SINUS PRESSURE: 0
SINUS PAIN: 0
BRUISES/BLEEDS EASILY: 0
LOSS OF SENSATION IN FEET: 0
PALPITATIONS: 0
SHORTNESS OF BREATH: 0

## 2026-03-05 ENCOUNTER — APPOINTMENT (OUTPATIENT)
Dept: PRIMARY CARE | Facility: CLINIC | Age: 61
End: 2026-03-05
Payer: COMMERCIAL